# Patient Record
Sex: FEMALE | Race: WHITE
[De-identification: names, ages, dates, MRNs, and addresses within clinical notes are randomized per-mention and may not be internally consistent; named-entity substitution may affect disease eponyms.]

---

## 2020-06-09 ENCOUNTER — HOSPITAL ENCOUNTER (OUTPATIENT)
Dept: HOSPITAL 19 - LDRO | Age: 30
Discharge: HOME | End: 2020-06-09
Attending: OBSTETRICS & GYNECOLOGY
Payer: COMMERCIAL

## 2020-06-09 VITALS — TEMPERATURE: 98.4 F | SYSTOLIC BLOOD PRESSURE: 127 MMHG | DIASTOLIC BLOOD PRESSURE: 75 MMHG | HEART RATE: 94 BPM

## 2020-06-09 VITALS — BODY MASS INDEX: 34.62 KG/M2 | WEIGHT: 215.39 LBS | HEIGHT: 65.98 IN

## 2020-06-09 VITALS — DIASTOLIC BLOOD PRESSURE: 71 MMHG | SYSTOLIC BLOOD PRESSURE: 120 MMHG | HEART RATE: 93 BPM

## 2020-06-09 DIAGNOSIS — Z3A.25: ICD-10-CM

## 2020-06-09 DIAGNOSIS — O36.8330: Primary | ICD-10-CM

## 2020-06-09 LAB
ALBUMIN SERPL-MCNC: 3.3 GM/DL (ref 3.5–5)
ALP SERPL-CCNC: 134 U/L (ref 50–136)
ALT SERPL-CCNC: 12 U/L (ref 4–34)
ANION GAP SERPL CALC-SCNC: 6 MMOL/L (ref 7–16)
AST SERPL-CCNC: 16 U/L (ref 15–37)
BASOPHILS # BLD: 0 10*3/UL (ref 0–0.2)
BASOPHILS NFR BLD AUTO: 0.4 % (ref 0–2)
BILIRUB SERPL-MCNC: 0.4 MG/DL (ref 0–1)
BUN SERPL-MCNC: 6 MG/DL (ref 7–17)
CALCIUM SERPL-MCNC: 8.6 MG/DL (ref 8.4–10.2)
CHLORIDE SERPL-SCNC: 109 MMOL/L (ref 98–107)
CO2 SERPL-SCNC: 18 MMOL/L (ref 22–30)
CREAT SERPL-SCNC: 0.45 UMOL/L (ref 0.52–1.25)
EOSINOPHIL # BLD: 0.1 10*3/UL (ref 0–0.7)
EOSINOPHIL NFR BLD: 0.5 % (ref 0–4)
ERYTHROCYTE [DISTWIDTH] IN BLOOD BY AUTOMATED COUNT: 13.4 % (ref 11.5–14.5)
GLUCOSE SERPL-MCNC: 95 MG/DL (ref 74–106)
GLUCOSE UR QL STRIP.AUTO: (no result)
GRANULOCYTES # BLD AUTO: 72.9 % (ref 42.2–75.2)
HCT VFR BLD AUTO: 38.4 % (ref 37–47)
HGB BLD-MCNC: 12.7 G/DL (ref 12.5–16)
LYMPHOCYTES # BLD: 1.9 10*3/UL (ref 1.2–3.4)
LYMPHOCYTES NFR BLD: 16.6 % (ref 20–51)
MCH RBC QN AUTO: 28 PG (ref 27–31)
MCHC RBC AUTO-ENTMCNC: 33 G/DL (ref 33–37)
MCV RBC AUTO: 86 FL (ref 80–100)
MONOCYTES # BLD: 1 10*3/UL (ref 0.1–0.6)
MONOCYTES NFR BLD AUTO: 8.5 % (ref 1.7–9.3)
NEUTROPHILS # BLD: 8.1 10*3/UL (ref 1.4–6.5)
PH UR STRIP.AUTO: 6 [PH] (ref 5–8)
PLATELET # BLD AUTO: 256 K/MM3 (ref 130–400)
PMV BLD AUTO: 10.3 FL (ref 7.4–10.4)
POTASSIUM SERPL-SCNC: 4.1 MMOL/L (ref 3.4–5)
PROT SERPL-MCNC: 6.6 GM/DL (ref 6.4–8.2)
RBC # BLD AUTO: 4.48 M/MM3 (ref 4.1–5.3)
RBC # UR: (no result) /HPF
SODIUM SERPL-SCNC: 133 MMOL/L (ref 137–145)
SP GR UR STRIP.AUTO: 1.02 (ref 1–1.03)
SQUAMOUS # URNS: (no result) /HPF
TSH SERPL DL<=0.005 MIU/L-ACNC: 2.21 UIU/ML (ref 0.47–4.68)
URINE LEUKOCYTE ESTERASE: (no result)
URN COLLECT METHOD CLASS: (no result)
WBC # UR: (no result) /HPF

## 2020-06-09 NOTE — NUR
Patient ambulatory to [facility]3, changed into gown, FHR/TOCO monitors placed and
explained. Patient sent over from the office due to fetal tachycardia. Patient
denies any regular contractions/vaginal bleeding/leakinig of fluid/decreased
fetal movement.
Plan of care discussed.
Assessment completed and questions answered.
 
1125: Lab at bedside drawing blood.

## 2020-06-09 NOTE — NUR
Patient updated to plan of care. EFMs off, patient changes into
clothes. Discharge instructions reviewed and return precautions given. Patient
ambulatory off of unit at this time.

## 2020-06-23 ENCOUNTER — HOSPITAL ENCOUNTER (INPATIENT)
Dept: HOSPITAL 19 - OB | Age: 30
LOS: 1 days | Discharge: HOME | End: 2020-06-24
Attending: OBSTETRICS & GYNECOLOGY | Admitting: OBSTETRICS & GYNECOLOGY
Payer: COMMERCIAL

## 2020-06-23 VITALS — SYSTOLIC BLOOD PRESSURE: 143 MMHG | HEART RATE: 101 BPM | DIASTOLIC BLOOD PRESSURE: 62 MMHG

## 2020-06-23 VITALS — SYSTOLIC BLOOD PRESSURE: 131 MMHG | DIASTOLIC BLOOD PRESSURE: 64 MMHG | HEART RATE: 121 BPM | TEMPERATURE: 99.1 F

## 2020-06-23 VITALS — DIASTOLIC BLOOD PRESSURE: 60 MMHG | SYSTOLIC BLOOD PRESSURE: 126 MMHG | HEART RATE: 104 BPM

## 2020-06-23 VITALS — DIASTOLIC BLOOD PRESSURE: 55 MMHG | SYSTOLIC BLOOD PRESSURE: 105 MMHG | HEART RATE: 101 BPM

## 2020-06-23 VITALS — HEART RATE: 121 BPM | SYSTOLIC BLOOD PRESSURE: 119 MMHG | TEMPERATURE: 97.9 F | DIASTOLIC BLOOD PRESSURE: 59 MMHG

## 2020-06-23 VITALS — DIASTOLIC BLOOD PRESSURE: 58 MMHG | HEART RATE: 108 BPM | SYSTOLIC BLOOD PRESSURE: 111 MMHG

## 2020-06-23 VITALS — DIASTOLIC BLOOD PRESSURE: 59 MMHG | HEART RATE: 117 BPM | SYSTOLIC BLOOD PRESSURE: 107 MMHG

## 2020-06-23 VITALS — HEART RATE: 116 BPM | SYSTOLIC BLOOD PRESSURE: 117 MMHG | DIASTOLIC BLOOD PRESSURE: 78 MMHG

## 2020-06-23 VITALS — HEART RATE: 96 BPM | DIASTOLIC BLOOD PRESSURE: 81 MMHG | SYSTOLIC BLOOD PRESSURE: 125 MMHG

## 2020-06-23 VITALS — DIASTOLIC BLOOD PRESSURE: 60 MMHG | SYSTOLIC BLOOD PRESSURE: 107 MMHG | HEART RATE: 95 BPM

## 2020-06-23 VITALS — HEART RATE: 92 BPM | DIASTOLIC BLOOD PRESSURE: 75 MMHG | SYSTOLIC BLOOD PRESSURE: 111 MMHG

## 2020-06-23 VITALS — SYSTOLIC BLOOD PRESSURE: 120 MMHG | DIASTOLIC BLOOD PRESSURE: 80 MMHG | HEART RATE: 94 BPM

## 2020-06-23 VITALS — DIASTOLIC BLOOD PRESSURE: 69 MMHG | HEART RATE: 123 BPM | SYSTOLIC BLOOD PRESSURE: 118 MMHG

## 2020-06-23 VITALS — DIASTOLIC BLOOD PRESSURE: 69 MMHG | HEART RATE: 102 BPM | SYSTOLIC BLOOD PRESSURE: 113 MMHG | TEMPERATURE: 97.9 F

## 2020-06-23 VITALS — SYSTOLIC BLOOD PRESSURE: 101 MMHG | HEART RATE: 89 BPM | DIASTOLIC BLOOD PRESSURE: 57 MMHG

## 2020-06-23 VITALS — HEART RATE: 86 BPM | DIASTOLIC BLOOD PRESSURE: 82 MMHG | SYSTOLIC BLOOD PRESSURE: 125 MMHG

## 2020-06-23 VITALS — SYSTOLIC BLOOD PRESSURE: 95 MMHG | HEART RATE: 94 BPM | DIASTOLIC BLOOD PRESSURE: 55 MMHG

## 2020-06-23 VITALS — HEART RATE: 95 BPM | DIASTOLIC BLOOD PRESSURE: 68 MMHG | SYSTOLIC BLOOD PRESSURE: 121 MMHG | TEMPERATURE: 97.9 F

## 2020-06-23 VITALS — DIASTOLIC BLOOD PRESSURE: 73 MMHG | SYSTOLIC BLOOD PRESSURE: 125 MMHG | HEART RATE: 104 BPM

## 2020-06-23 VITALS — HEART RATE: 106 BPM | SYSTOLIC BLOOD PRESSURE: 106 MMHG | DIASTOLIC BLOOD PRESSURE: 56 MMHG

## 2020-06-23 VITALS — HEART RATE: 100 BPM | SYSTOLIC BLOOD PRESSURE: 116 MMHG | DIASTOLIC BLOOD PRESSURE: 72 MMHG

## 2020-06-23 VITALS — HEART RATE: 87 BPM | DIASTOLIC BLOOD PRESSURE: 69 MMHG | SYSTOLIC BLOOD PRESSURE: 122 MMHG

## 2020-06-23 VITALS — SYSTOLIC BLOOD PRESSURE: 108 MMHG | DIASTOLIC BLOOD PRESSURE: 63 MMHG | HEART RATE: 90 BPM

## 2020-06-23 VITALS — HEART RATE: 107 BPM | DIASTOLIC BLOOD PRESSURE: 68 MMHG | SYSTOLIC BLOOD PRESSURE: 137 MMHG

## 2020-06-23 VITALS — HEART RATE: 104 BPM | SYSTOLIC BLOOD PRESSURE: 127 MMHG | DIASTOLIC BLOOD PRESSURE: 63 MMHG

## 2020-06-23 VITALS — HEART RATE: 104 BPM | DIASTOLIC BLOOD PRESSURE: 63 MMHG | SYSTOLIC BLOOD PRESSURE: 127 MMHG

## 2020-06-23 VITALS — SYSTOLIC BLOOD PRESSURE: 119 MMHG | TEMPERATURE: 98.2 F | DIASTOLIC BLOOD PRESSURE: 56 MMHG | HEART RATE: 131 BPM

## 2020-06-23 VITALS — DIASTOLIC BLOOD PRESSURE: 55 MMHG | HEART RATE: 106 BPM | SYSTOLIC BLOOD PRESSURE: 116 MMHG

## 2020-06-23 VITALS — DIASTOLIC BLOOD PRESSURE: 66 MMHG | HEART RATE: 121 BPM | SYSTOLIC BLOOD PRESSURE: 115 MMHG

## 2020-06-23 VITALS — SYSTOLIC BLOOD PRESSURE: 121 MMHG | HEART RATE: 98 BPM | DIASTOLIC BLOOD PRESSURE: 79 MMHG

## 2020-06-23 VITALS — HEART RATE: 106 BPM | DIASTOLIC BLOOD PRESSURE: 56 MMHG | SYSTOLIC BLOOD PRESSURE: 115 MMHG

## 2020-06-23 VITALS — DIASTOLIC BLOOD PRESSURE: 61 MMHG | HEART RATE: 108 BPM | SYSTOLIC BLOOD PRESSURE: 112 MMHG

## 2020-06-23 VITALS — SYSTOLIC BLOOD PRESSURE: 108 MMHG | HEART RATE: 120 BPM | DIASTOLIC BLOOD PRESSURE: 53 MMHG

## 2020-06-23 VITALS — HEART RATE: 115 BPM | SYSTOLIC BLOOD PRESSURE: 122 MMHG | DIASTOLIC BLOOD PRESSURE: 59 MMHG

## 2020-06-23 VITALS — SYSTOLIC BLOOD PRESSURE: 123 MMHG | DIASTOLIC BLOOD PRESSURE: 63 MMHG | HEART RATE: 126 BPM

## 2020-06-23 VITALS — SYSTOLIC BLOOD PRESSURE: 115 MMHG | DIASTOLIC BLOOD PRESSURE: 68 MMHG | HEART RATE: 92 BPM

## 2020-06-23 VITALS — HEIGHT: 65.98 IN | BODY MASS INDEX: 35.25 KG/M2 | WEIGHT: 219.36 LBS

## 2020-06-23 VITALS — TEMPERATURE: 98.6 F | DIASTOLIC BLOOD PRESSURE: 76 MMHG | SYSTOLIC BLOOD PRESSURE: 121 MMHG | HEART RATE: 93 BPM

## 2020-06-23 VITALS — SYSTOLIC BLOOD PRESSURE: 125 MMHG | HEART RATE: 103 BPM | DIASTOLIC BLOOD PRESSURE: 66 MMHG | TEMPERATURE: 97.9 F

## 2020-06-23 VITALS — SYSTOLIC BLOOD PRESSURE: 121 MMHG | DIASTOLIC BLOOD PRESSURE: 66 MMHG | HEART RATE: 110 BPM

## 2020-06-23 DIAGNOSIS — O26.893: ICD-10-CM

## 2020-06-23 DIAGNOSIS — Z3A.37: ICD-10-CM

## 2020-06-23 DIAGNOSIS — E28.2: ICD-10-CM

## 2020-06-23 DIAGNOSIS — O45.93: Primary | ICD-10-CM

## 2020-06-23 LAB
BASOPHILS # BLD: 0 10*3/UL (ref 0–0.2)
BASOPHILS NFR BLD AUTO: 0.4 % (ref 0–2)
EOSINOPHIL # BLD: 0 10*3/UL (ref 0–0.7)
EOSINOPHIL NFR BLD: 0.4 % (ref 0–4)
ERYTHROCYTE [DISTWIDTH] IN BLOOD BY AUTOMATED COUNT: 13.5 % (ref 11.5–14.5)
GRANULOCYTES # BLD AUTO: 75 % (ref 42.2–75.2)
HCT VFR BLD AUTO: 38.4 % (ref 37–47)
HGB BLD-MCNC: 12.3 G/DL (ref 12.5–16)
LYMPHOCYTES # BLD: 1.6 10*3/UL (ref 1.2–3.4)
LYMPHOCYTES NFR BLD: 15.9 % (ref 20–51)
MCH RBC QN AUTO: 27 PG (ref 27–31)
MCHC RBC AUTO-ENTMCNC: 32 G/DL (ref 33–37)
MCV RBC AUTO: 85 FL (ref 80–100)
MONOCYTES # BLD: 0.8 10*3/UL (ref 0.1–0.6)
MONOCYTES NFR BLD AUTO: 7.5 % (ref 1.7–9.3)
NEUTROPHILS # BLD: 7.7 10*3/UL (ref 1.4–6.5)
PLATELET # BLD AUTO: 248 K/MM3 (ref 130–400)
PMV BLD AUTO: 10.7 FL (ref 7.4–10.4)
RBC # BLD AUTO: 4.52 M/MM3 (ref 4.1–5.3)

## 2020-06-23 PROCEDURE — 3E033VJ INTRODUCTION OF OTHER HORMONE INTO PERIPHERAL VEIN, PERCUTANEOUS APPROACH: ICD-10-PCS | Performed by: OBSTETRICS & GYNECOLOGY

## 2020-06-23 PROCEDURE — 0KQM0ZZ REPAIR PERINEUM MUSCLE, OPEN APPROACH: ICD-10-PCS | Performed by: OBSTETRICS & GYNECOLOGY

## 2020-06-23 PROCEDURE — 10907ZC DRAINAGE OF AMNIOTIC FLUID, THERAPEUTIC FROM PRODUCTS OF CONCEPTION, VIA NATURAL OR ARTIFICIAL OPENING: ICD-10-PCS | Performed by: OBSTETRICS & GYNECOLOGY

## 2020-06-23 NOTE — NUR
1300- Pt. begins practice pushing with RN at bedside.
1310- Dr. Fontana to the bedside to assess practice pushing. Provider remains on
unit during entire pushing duration. Provider between labor rooms and/or at
labor desk.
1430- Provider at bedside to assess pushing. 1
1450- Decision to use vacuum made by provider. Discussed with patient and
answered questions and addressed concerns.
[id number] Vacuum applied during contractions. Pressure released inbetween
contractions. No pop-offs noted.
1503- VAVD by Dr. Fontana of viable male infant. Infant to mothers abdomen.
Infant care assumed by MARION Villafana RN. Pitocin turned off.
1508-  of placenta. Pitocin turned on and placed on 333ml/hr per protocol.
1518- Provider red yung straight cathed patient and reports 50ml output.
Provider noted 2nd degree, and EBL of 300.
1520- Pt. cleaned up, bed put back together, ice pack placed, linens changed,
vitals taken and recovery started.

## 2020-06-23 NOTE — NUR
Pt. ambulatory to the floor with  by her side. Oriented to room and
changed into gown. Reports GFM, no ctx, no LOF and no bleeding. Vital signs
taken, assessment completed, consents signed. EFM and TOCO on and tracing.

## 2020-06-23 NOTE — NUR
0815- DR. YOUNG AT BEDSIDE FOR AROM. SVE 3/90-2. PROCEDURE EXPLAINED AND
QUESTIONS ANSWERED.
0817- AROM, SMALL AMOUNT OF CLEAR, ODERLESS FLUID NOTED. EFM AND TOCO ON AND
TRACING.

## 2020-06-23 NOTE — NUR
1010- Pt. repositioned to RL.
1015- Pt. BP was 105/55 with a pulse of 101
1019- Pt. verbalized not feeling well and feeling lightheaded. BP noted to be
99/63 with pulse of 85. Patient HOB put down, cold cloth applied head.
1020- BP 91/56 with pulse 93
1021- BP 97/60 with pulse 81
1023- BP 88/49 with pulse of 77. Ephedrine given via IV.
1025- BP 87/54 with pulse of 86
1026- BP 95/53 with pulse of 92
1027- BP 95/55 with pulse of 94
1028- BP 88/52 with pulse of 100. Second dose of Ephedrine given via IV.
1030- /59 with pulse of 77. Pt. verbalized feeling a little better.
1032- BP 91/51 with pulse of 103.
1034- BP 99/58 with pulse of 97
1035- BP 96/54 with pulse of 104. Pt. verbalized feeling much better.
1040- /57 with pulse of 89
Will continue to monitor. Call light within reach.

## 2020-06-24 VITALS — TEMPERATURE: 97.8 F | DIASTOLIC BLOOD PRESSURE: 73 MMHG | SYSTOLIC BLOOD PRESSURE: 117 MMHG | HEART RATE: 97 BPM

## 2020-06-24 VITALS — TEMPERATURE: 98 F | DIASTOLIC BLOOD PRESSURE: 65 MMHG | HEART RATE: 104 BPM | SYSTOLIC BLOOD PRESSURE: 113 MMHG

## 2020-06-24 VITALS — DIASTOLIC BLOOD PRESSURE: 73 MMHG | SYSTOLIC BLOOD PRESSURE: 126 MMHG | HEART RATE: 92 BPM | TEMPERATURE: 98.2 F

## 2020-06-24 VITALS — SYSTOLIC BLOOD PRESSURE: 128 MMHG | TEMPERATURE: 98.2 F | HEART RATE: 97 BPM | DIASTOLIC BLOOD PRESSURE: 75 MMHG

## 2020-06-24 LAB
BASOPHILS # BLD: 0.1 10*3/UL (ref 0–0.2)
BASOPHILS NFR BLD AUTO: 0.4 % (ref 0–2)
EOSINOPHIL # BLD: 0 10*3/UL (ref 0–0.7)
EOSINOPHIL NFR BLD: 0.2 % (ref 0–4)
ERYTHROCYTE [DISTWIDTH] IN BLOOD BY AUTOMATED COUNT: 13.7 % (ref 11.5–14.5)
GRANULOCYTES # BLD AUTO: 79 % (ref 42.2–75.2)
HCT VFR BLD AUTO: 32 % (ref 37–47)
HGB BLD-MCNC: 10.6 G/DL (ref 12.5–16)
LYMPHOCYTES # BLD: 1.8 10*3/UL (ref 1.2–3.4)
LYMPHOCYTES NFR BLD: 11.3 % (ref 20–51)
MCH RBC QN AUTO: 28 PG (ref 27–31)
MCHC RBC AUTO-ENTMCNC: 33 G/DL (ref 33–37)
MCV RBC AUTO: 86 FL (ref 80–100)
MONOCYTES # BLD: 1.4 10*3/UL (ref 0.1–0.6)
MONOCYTES NFR BLD AUTO: 8.4 % (ref 1.7–9.3)
NEUTROPHILS # BLD: 12.7 10*3/UL (ref 1.4–6.5)
PLATELET # BLD AUTO: 215 K/MM3 (ref 130–400)
PMV BLD AUTO: 10.6 FL (ref 7.4–10.4)
RBC # BLD AUTO: 3.74 M/MM3 (ref 4.1–5.3)

## 2020-10-26 ENCOUNTER — HOSPITAL ENCOUNTER (OUTPATIENT)
Dept: HOSPITAL 6 - LAB | Age: 30
End: 2020-10-26
Attending: NURSE PRACTITIONER
Payer: COMMERCIAL

## 2020-10-26 DIAGNOSIS — Z20.828: Primary | ICD-10-CM
